# Patient Record
Sex: FEMALE | Race: WHITE | HISPANIC OR LATINO | URBAN - METROPOLITAN AREA
[De-identification: names, ages, dates, MRNs, and addresses within clinical notes are randomized per-mention and may not be internally consistent; named-entity substitution may affect disease eponyms.]

---

## 2022-04-27 ENCOUNTER — APPOINTMENT (RX ONLY)
Dept: URBAN - METROPOLITAN AREA CLINIC 15 | Facility: CLINIC | Age: 32
Setting detail: DERMATOLOGY
End: 2022-04-27

## 2022-04-27 VITALS — HEIGHT: 64 IN | WEIGHT: 165 LBS

## 2022-04-27 DIAGNOSIS — D49.2 NEOPLASM OF UNSPECIFIED BEHAVIOR OF BONE, SOFT TISSUE, AND SKIN: ICD-10-CM

## 2022-04-27 PROCEDURE — ? ORDER ULTRASOUND

## 2022-04-27 PROCEDURE — ? COUNSELING

## 2022-04-27 PROCEDURE — 99202 OFFICE O/P NEW SF 15 MIN: CPT

## 2022-04-27 PROCEDURE — ? ADDITIONAL NOTES

## 2022-04-27 ASSESSMENT — LOCATION DETAILED DESCRIPTION DERM: LOCATION DETAILED: RIGHT ANTERIOR SHOULDER

## 2022-04-27 ASSESSMENT — LOCATION ZONE DERM: LOCATION ZONE: ARM

## 2022-04-27 ASSESSMENT — LOCATION SIMPLE DESCRIPTION DERM: LOCATION SIMPLE: RIGHT SHOULDER

## 2022-04-27 NOTE — PROCEDURE: ORDER ULTRASOUND
Ultrasound Protocol: Ultrasound of Subcutaneous Mass
Other Ultrasound Protocol Name: Right buttock
Provider: Dyana Painting PA-C
Priority: normal
Detail Level: Simple
Neck Ultrasound Reason: Enlarged lymph node.
Subcutaneous Lesion Ultrasound Reason: Growing in size
Breast Ultrasound Reason: Non tender nodule on RUQ of right breast.
Lesion Location: right shoulder

## 2022-04-27 NOTE — PROCEDURE: MIPS QUALITY
Additional Notes: Patient states he is not taking medications
Detail Level: Detailed
Quality 130: Documentation Of Current Medications In The Medical Record: Documentation of a medical reason(s) for not documenting, updating, or reviewing the patientâs current medications list (e.g., patient is in an urgent or emergent medical situation)

## 2022-04-27 NOTE — PROCEDURE: ADDITIONAL NOTES
Render Risk Assessment In Note?: no
Additional Notes: .\\n\\nIf positive and it is a Lipoma, advised patient to let us for referral of surgeon.
Detail Level: Zone